# Patient Record
Sex: MALE | Race: WHITE | Employment: FULL TIME | URBAN - METROPOLITAN AREA
[De-identification: names, ages, dates, MRNs, and addresses within clinical notes are randomized per-mention and may not be internally consistent; named-entity substitution may affect disease eponyms.]

---

## 2024-09-15 ENCOUNTER — APPOINTMENT (OUTPATIENT)
Dept: GENERAL RADIOLOGY | Age: 29
End: 2024-09-15
Payer: COMMERCIAL

## 2024-09-15 ENCOUNTER — HOSPITAL ENCOUNTER (EMERGENCY)
Age: 29
Discharge: HOME | End: 2024-09-15
Attending: EMERGENCY MEDICINE
Payer: COMMERCIAL

## 2024-09-15 VITALS
OXYGEN SATURATION: 98 % | DIASTOLIC BLOOD PRESSURE: 78 MMHG | BODY MASS INDEX: 23.62 KG/M2 | SYSTOLIC BLOOD PRESSURE: 127 MMHG | RESPIRATION RATE: 16 BRPM | TEMPERATURE: 97.9 F | HEIGHT: 75 IN | WEIGHT: 190 LBS | HEART RATE: 74 BPM

## 2024-09-15 DIAGNOSIS — G40.919 BREAKTHROUGH SEIZURE (HCC): Primary | ICD-10-CM

## 2024-09-15 DIAGNOSIS — S43.004A DISLOCATION OF RIGHT SHOULDER JOINT, INITIAL ENCOUNTER: ICD-10-CM

## 2024-09-15 LAB
ANION GAP SERPL CALCULATED.3IONS-SCNC: 10 MMOL/L (ref 7–16)
BASOPHILS # BLD: 0.04 K/UL (ref 0–0.2)
BASOPHILS NFR BLD: 1 % (ref 0–2)
BUN SERPL-MCNC: 14 MG/DL (ref 6–20)
CALCIUM SERPL-MCNC: 8.8 MG/DL (ref 8.6–10.2)
CHLORIDE SERPL-SCNC: 103 MMOL/L (ref 98–107)
CO2 SERPL-SCNC: 25 MMOL/L (ref 22–29)
CREAT SERPL-MCNC: 0.8 MG/DL (ref 0.7–1.2)
DATE LAST DOSE: ABNORMAL
EOSINOPHIL # BLD: 0.06 K/UL (ref 0.05–0.5)
EOSINOPHILS RELATIVE PERCENT: 1 % (ref 0–6)
ERYTHROCYTE [DISTWIDTH] IN BLOOD BY AUTOMATED COUNT: 12.5 % (ref 11.5–15)
GFR, ESTIMATED: >90 ML/MIN/1.73M2
GLUCOSE SERPL-MCNC: 93 MG/DL (ref 74–99)
HCT VFR BLD AUTO: 42.7 % (ref 37–54)
HGB BLD-MCNC: 14.2 G/DL (ref 12.5–16.5)
IMM GRANULOCYTES # BLD AUTO: 0.06 K/UL (ref 0–0.58)
IMM GRANULOCYTES NFR BLD: 1 % (ref 0–5)
LYMPHOCYTES NFR BLD: 1.15 K/UL (ref 1.5–4)
LYMPHOCYTES RELATIVE PERCENT: 13 % (ref 20–42)
MCH RBC QN AUTO: 32.3 PG (ref 26–35)
MCHC RBC AUTO-ENTMCNC: 33.3 G/DL (ref 32–34.5)
MCV RBC AUTO: 97 FL (ref 80–99.9)
MONOCYTES NFR BLD: 0.74 K/UL (ref 0.1–0.95)
MONOCYTES NFR BLD: 9 % (ref 2–12)
NEUTROPHILS NFR BLD: 76 % (ref 43–80)
NEUTS SEG NFR BLD: 6.57 K/UL (ref 1.8–7.3)
PLATELET # BLD AUTO: 215 K/UL (ref 130–450)
PMV BLD AUTO: 9.7 FL (ref 7–12)
POTASSIUM SERPL-SCNC: 4.3 MMOL/L (ref 3.5–5)
RBC # BLD AUTO: 4.4 M/UL (ref 3.8–5.8)
SODIUM SERPL-SCNC: 138 MMOL/L (ref 132–146)
TME LAST DOSE: ABNORMAL H
VALPROATE SERPL-MCNC: 19 UG/ML (ref 50–100)
VANCOMYCIN DOSE: ABNORMAL MG
WBC OTHER # BLD: 8.6 K/UL (ref 4.5–11.5)

## 2024-09-15 PROCEDURE — 6360000002 HC RX W HCPCS

## 2024-09-15 PROCEDURE — 23650 CLTX SHO DSLC W/MNPJ WO ANES: CPT

## 2024-09-15 PROCEDURE — 96374 THER/PROPH/DIAG INJ IV PUSH: CPT

## 2024-09-15 PROCEDURE — 80164 ASSAY DIPROPYLACETIC ACD TOT: CPT

## 2024-09-15 PROCEDURE — 2500000003 HC RX 250 WO HCPCS

## 2024-09-15 PROCEDURE — 73030 X-RAY EXAM OF SHOULDER: CPT

## 2024-09-15 PROCEDURE — 99285 EMERGENCY DEPT VISIT HI MDM: CPT

## 2024-09-15 PROCEDURE — 96375 TX/PRO/DX INJ NEW DRUG ADDON: CPT

## 2024-09-15 PROCEDURE — 80048 BASIC METABOLIC PNL TOTAL CA: CPT

## 2024-09-15 PROCEDURE — 2580000003 HC RX 258

## 2024-09-15 PROCEDURE — 85025 COMPLETE CBC W/AUTO DIFF WBC: CPT

## 2024-09-15 RX ORDER — PROPOFOL 10 MG/ML
1 INJECTION, EMULSION INTRAVENOUS ONCE
Status: COMPLETED | OUTPATIENT
Start: 2024-09-15 | End: 2024-09-15

## 2024-09-15 RX ORDER — 0.9 % SODIUM CHLORIDE 0.9 %
1000 INTRAVENOUS SOLUTION INTRAVENOUS ONCE
Status: COMPLETED | OUTPATIENT
Start: 2024-09-15 | End: 2024-09-15

## 2024-09-15 RX ORDER — KETAMINE HYDROCHLORIDE 10 MG/ML
1 INJECTION, SOLUTION INTRAMUSCULAR; INTRAVENOUS ONCE
Status: COMPLETED | OUTPATIENT
Start: 2024-09-15 | End: 2024-09-15

## 2024-09-15 RX ADMIN — KETAMINE HYDROCHLORIDE 75 MG: 10 INJECTION INTRAMUSCULAR; INTRAVENOUS at 09:20

## 2024-09-15 RX ADMIN — SODIUM CHLORIDE 1000 ML: 9 INJECTION, SOLUTION INTRAVENOUS at 07:29

## 2024-09-15 RX ADMIN — PROPOFOL 75 MG: 10 INJECTION, EMULSION INTRAVENOUS at 09:20

## 2024-09-15 ASSESSMENT — PAIN DESCRIPTION - ORIENTATION
ORIENTATION: RIGHT
ORIENTATION: RIGHT

## 2024-09-15 ASSESSMENT — PAIN DESCRIPTION - ONSET: ONSET: SUDDEN

## 2024-09-15 ASSESSMENT — LIFESTYLE VARIABLES
HOW OFTEN DO YOU HAVE A DRINK CONTAINING ALCOHOL: NEVER
HOW MANY STANDARD DRINKS CONTAINING ALCOHOL DO YOU HAVE ON A TYPICAL DAY: PATIENT DOES NOT DRINK

## 2024-09-15 ASSESSMENT — PAIN DESCRIPTION - DESCRIPTORS
DESCRIPTORS: ACHING
DESCRIPTORS: ACHING

## 2024-09-15 ASSESSMENT — PAIN SCALES - GENERAL
PAINLEVEL_OUTOF10: 8

## 2024-09-15 ASSESSMENT — PAIN - FUNCTIONAL ASSESSMENT: PAIN_FUNCTIONAL_ASSESSMENT: 0-10

## 2024-09-15 ASSESSMENT — PAIN DESCRIPTION - LOCATION: LOCATION: SHOULDER

## 2024-09-15 ASSESSMENT — PAIN DESCRIPTION - FREQUENCY: FREQUENCY: CONTINUOUS

## 2024-12-26 ENCOUNTER — HOSPITAL ENCOUNTER (EMERGENCY)
Age: 29
Discharge: HOME | End: 2024-12-26
Attending: EMERGENCY MEDICINE
Payer: COMMERCIAL

## 2024-12-26 ENCOUNTER — APPOINTMENT (OUTPATIENT)
Dept: GENERAL RADIOLOGY | Age: 29
End: 2024-12-26
Payer: COMMERCIAL

## 2024-12-26 VITALS
TEMPERATURE: 98 F | SYSTOLIC BLOOD PRESSURE: 114 MMHG | BODY MASS INDEX: 26.11 KG/M2 | WEIGHT: 210 LBS | OXYGEN SATURATION: 97 % | DIASTOLIC BLOOD PRESSURE: 64 MMHG | RESPIRATION RATE: 18 BRPM | HEIGHT: 75 IN | HEART RATE: 92 BPM

## 2024-12-26 DIAGNOSIS — S43.004A DISLOCATION OF RIGHT SHOULDER JOINT, INITIAL ENCOUNTER: Primary | ICD-10-CM

## 2024-12-26 PROCEDURE — 96374 THER/PROPH/DIAG INJ IV PUSH: CPT

## 2024-12-26 PROCEDURE — 2500000003 HC RX 250 WO HCPCS: Performed by: EMERGENCY MEDICINE

## 2024-12-26 PROCEDURE — 23655 CLTX SHO DSLC W/MNPJ W/ANES: CPT

## 2024-12-26 PROCEDURE — 99284 EMERGENCY DEPT VISIT MOD MDM: CPT

## 2024-12-26 PROCEDURE — 73030 X-RAY EXAM OF SHOULDER: CPT

## 2024-12-26 PROCEDURE — 99152 MOD SED SAME PHYS/QHP 5/>YRS: CPT

## 2024-12-26 PROCEDURE — 99153 MOD SED SAME PHYS/QHP EA: CPT

## 2024-12-26 RX ORDER — KETAMINE HYDROCHLORIDE 10 MG/ML
1 INJECTION, SOLUTION INTRAMUSCULAR; INTRAVENOUS ONCE
Status: COMPLETED | OUTPATIENT
Start: 2024-12-26 | End: 2024-12-26

## 2024-12-26 RX ADMIN — KETAMINE HYDROCHLORIDE 55.3 MG: 10 INJECTION INTRAMUSCULAR; INTRAVENOUS at 01:23

## 2024-12-26 ASSESSMENT — PAIN DESCRIPTION - LOCATION
LOCATION: SHOULDER
LOCATION: SHOULDER

## 2024-12-26 ASSESSMENT — PAIN SCALES - GENERAL
PAINLEVEL_OUTOF10: 9
PAINLEVEL_OUTOF10: 10

## 2024-12-26 ASSESSMENT — PAIN DESCRIPTION - DESCRIPTORS
DESCRIPTORS: THROBBING;SHOOTING
DESCRIPTORS: STABBING;TENDER;ACHING

## 2024-12-26 ASSESSMENT — PAIN - FUNCTIONAL ASSESSMENT: PAIN_FUNCTIONAL_ASSESSMENT: 0-10

## 2024-12-26 ASSESSMENT — LIFESTYLE VARIABLES
HOW MANY STANDARD DRINKS CONTAINING ALCOHOL DO YOU HAVE ON A TYPICAL DAY: 3 OR 4
HOW OFTEN DO YOU HAVE A DRINK CONTAINING ALCOHOL: 2-3 TIMES A WEEK

## 2024-12-26 ASSESSMENT — PAIN DESCRIPTION - ORIENTATION
ORIENTATION: RIGHT
ORIENTATION: RIGHT

## 2024-12-26 NOTE — ED NOTES
55mg Ketamine given via IV 20 gauge left hand by . Patient connected to cardiac monitor, BP cuff, CO2 monitor, pulse ox monitor, on 2L NC, ambu bag at bedside.

## 2024-12-26 NOTE — ED PROVIDER NOTES
HPI:  12/26/24, Time: 12:30 AM LILY Berger is a 29 y.o. male history of hypothyroidism history of seizure history of shoulder dislocation presenting to the ED for right shoulder pain, beginning short time ago.  The complaint has been persistent, moderate in severity, and worsened by movement of right shoulder.  Patient reports he was play fighting with nephew as well as sister.  Patient reports they are wrestling on the ground and his shoulder dislocated.  Patient has history of this in the past he reports no head injury reports no neck or back pain or chest pain or abdominal pain.  Patient reporting prior history of dislocations in the past especially the left which he usually is able to reduce on his own.  Patient denies any seizure activity today    ROS:   Pertinent positives and negatives are stated within HPI, all other systems reviewed and are negative.  --------------------------------------------- PAST HISTORY ---------------------------------------------  Past Medical History:  has a past medical history of Hypothyroidism and Seizures (HCC).    Past Surgical History:  has no past surgical history on file.    Social History:  reports that he has been smoking cigarettes. He has never used smokeless tobacco. He reports that he does not drink alcohol and does not use drugs.    Family History: family history includes Asthma in his brother and sister; Hypertension in his mother; Ovarian Cancer in his maternal grandmother; Stroke in his father; Thyroid Disease in his maternal aunt, maternal grandmother, and another family member.     The patient’s home medications have been reviewed.    Allergies: Patient has no known allergies.    ---------------------------------------------------PHYSICAL EXAM--------------------------------------    Constitutional/General: Alert and oriented x3, well appearing, non toxic in NAD  Head: Normocephalic and atraumatic  Eyes: PERRL, EOMI  Mouth: Oropharynx clear,

## 2024-12-27 ENCOUNTER — TELEPHONE (OUTPATIENT)
Dept: ORTHOPEDIC SURGERY | Age: 29
End: 2024-12-27

## 2024-12-27 NOTE — TELEPHONE ENCOUNTER
----- Message from Dr. Jayy Gomez MD sent at 12/26/2024  5:47 PM EST -----  Raudel next week  ----- Message -----  From: Jose Manuel Freed MD  Sent: 12/26/2024   3:34 PM EST  To: Jayy Gomez MD

## 2025-04-14 ENCOUNTER — APPOINTMENT (OUTPATIENT)
Dept: GENERAL RADIOLOGY | Age: 30
End: 2025-04-14
Payer: COMMERCIAL

## 2025-04-14 ENCOUNTER — HOSPITAL ENCOUNTER (EMERGENCY)
Age: 30
Discharge: HOME | End: 2025-04-14
Attending: EMERGENCY MEDICINE
Payer: COMMERCIAL

## 2025-04-14 VITALS
OXYGEN SATURATION: 99 % | HEART RATE: 88 BPM | RESPIRATION RATE: 19 BRPM | TEMPERATURE: 97.9 F | SYSTOLIC BLOOD PRESSURE: 129 MMHG | DIASTOLIC BLOOD PRESSURE: 89 MMHG

## 2025-04-14 DIAGNOSIS — S43.005A DISLOCATION OF LEFT SHOULDER JOINT, INITIAL ENCOUNTER: Primary | ICD-10-CM

## 2025-04-14 PROCEDURE — 6370000000 HC RX 637 (ALT 250 FOR IP): Performed by: PHYSICIAN ASSISTANT

## 2025-04-14 PROCEDURE — 73030 X-RAY EXAM OF SHOULDER: CPT

## 2025-04-14 PROCEDURE — 99283 EMERGENCY DEPT VISIT LOW MDM: CPT

## 2025-04-14 PROCEDURE — 6360000002 HC RX W HCPCS

## 2025-04-14 PROCEDURE — 23655 CLTX SHO DSLC W/MNPJ W/ANES: CPT

## 2025-04-14 RX ORDER — OXYCODONE AND ACETAMINOPHEN 5; 325 MG/1; MG/1
1 TABLET ORAL ONCE
Refills: 0 | Status: COMPLETED | OUTPATIENT
Start: 2025-04-14 | End: 2025-04-14

## 2025-04-14 RX ORDER — LIDOCAINE HYDROCHLORIDE 10 MG/ML
20 INJECTION, SOLUTION INFILTRATION; PERINEURAL ONCE
Status: COMPLETED | OUTPATIENT
Start: 2025-04-14 | End: 2025-04-14

## 2025-04-14 RX ADMIN — OXYCODONE AND ACETAMINOPHEN 1 TABLET: 5; 325 TABLET ORAL at 13:35

## 2025-04-14 RX ADMIN — LIDOCAINE HYDROCHLORIDE 20 ML: 10 INJECTION, SOLUTION INFILTRATION; PERINEURAL at 15:15

## 2025-04-14 ASSESSMENT — LIFESTYLE VARIABLES
HOW OFTEN DO YOU HAVE A DRINK CONTAINING ALCOHOL: MONTHLY OR LESS
HOW MANY STANDARD DRINKS CONTAINING ALCOHOL DO YOU HAVE ON A TYPICAL DAY: 1 OR 2

## 2025-04-14 ASSESSMENT — PAIN SCALES - GENERAL: PAINLEVEL_OUTOF10: 6

## 2025-04-14 NOTE — ED PROVIDER NOTES
[facility] EMERGENCY DEPARTMENT  EMERGENCY DEPARTMENT ENCOUNTER        Pt Name: Alfonso Berger  MRN: [id number]  Birthdate 1995  Date of evaluation: 4/14/2025  Provider: Emeterio Dodd DO  PCP: Bandar Choi DO  Note Started: 2:58 PM EDT 4/14/25    CHIEF COMPLAINT       Chief Complaint   Patient presents with    Arm Injury     Left arm dislocated at work today        HISTORY OF PRESENT ILLNESS: 1 or more Elements   History From: patient    Limitations to history : None    Alfonso Berger is a 30 y.o. male who presents the emergency department for left shoulder pain that started 2 hours prior to arrival.  Patient reports he went to reach for something on the shelf and believes he dislocated his left shoulder.  He dislocated his left shoulder multiple times. Patient denies fever, chills, headache, shortness of breath, chest pain, abdominal pain, nausea, vomiting, diarrhea.    Nursing Notes were all reviewed and agreed with or any disagreements were addressed in the HPI.          REVIEW OF SYSTEMS :           Positives and Pertinent negatives as per HPI.     SURGICAL HISTORY   No past surgical history on file.    CURRENTMEDICATIONS       Discharge Medication List as of 4/14/2025  6:13 PM        CONTINUE these medications which have NOT CHANGED    Details   divalproex (DEPAKOTE ER) 250 MG ER tablet Take 500 mg by mouth 2 times daily. Take one tablet by the mouth every morning and 2 tablets in the evening      levothyroxine (SYNTHROID) 88 MCG tablet Take 1 tablet by mouth daily., Disp-90 tablet, R-1             ALLERGIES     Patient has no known allergies.    FAMILYHISTORY       Family History   Problem Relation Age of Onset    Hypertension Mother     Stroke Father     Asthma Sister     Asthma Brother     Thyroid Disease Maternal Aunt     Thyroid Disease Maternal Grandmother     Ovarian Cancer Maternal Grandmother     Thyroid Disease Other         SOCIAL HISTORY       Social History

## 2025-04-17 ENCOUNTER — OFFICE VISIT (OUTPATIENT)
Age: 30
End: 2025-04-17

## 2025-04-17 VITALS — HEIGHT: 75 IN | BODY MASS INDEX: 26.11 KG/M2 | WEIGHT: 210 LBS | TEMPERATURE: 97.9 F

## 2025-04-17 DIAGNOSIS — S43.015A ANTERIOR SHOULDER DISLOCATION, LEFT, INITIAL ENCOUNTER: Primary | ICD-10-CM

## 2025-04-17 NOTE — PROGRESS NOTES
Chief Complaint   Patient presents with    Shoulder Pain     Pt presents to office today for left shoulder pain. Injury at work happened on 4-14-25. Pt is a  and an object twisted his arm one way and his body the other.        Alfonso Berger  presents for initial evaluation of his patient dislocated his left shoulder at work they were not able to reduce it.  Went to the emergency room where he had a closed reduction.  He has been on restricted duty since then..       Past Medical History:   Diagnosis Date    Hypothyroidism     Seizures (HCC)      No past surgical history on file.    Current Outpatient Medications:     divalproex (DEPAKOTE ER) 250 MG ER tablet, Take 2 tablets by mouth 2 times daily Take one tablet by the mouth every morning and 2 tablets in the evening, Disp: , Rfl:   No Known Allergies  Social History     Socioeconomic History    Marital status: Single     Spouse name: Not on file    Number of children: Not on file    Years of education: Not on file    Highest education level: Not on file   Occupational History    Not on file   Tobacco Use    Smoking status: Every Day     Current packs/day: 0.50     Types: Cigarettes    Smokeless tobacco: Never   Substance and Sexual Activity    Alcohol use: No    Drug use: No    Sexual activity: Not on file   Other Topics Concern    Not on file   Social History Narrative    Not on file     Social Drivers of Health     Financial Resource Strain: Not on file   Food Insecurity: Not on file   Transportation Needs: Not on file   Physical Activity: Not on file   Stress: Not on file   Social Connections: Not on file   Intimate Partner Violence: Not on file   Housing Stability: Not on file     Family History   Problem Relation Age of Onset    Hypertension Mother     Stroke Father     Asthma Sister     Asthma Brother     Thyroid Disease Maternal Aunt     Thyroid Disease Maternal Grandmother     Ovarian Cancer Maternal Grandmother     Thyroid Disease Other

## 2025-05-18 ENCOUNTER — APPOINTMENT (OUTPATIENT)
Dept: GENERAL RADIOLOGY | Age: 30
End: 2025-05-18
Payer: COMMERCIAL

## 2025-05-18 ENCOUNTER — HOSPITAL ENCOUNTER (EMERGENCY)
Age: 30
Discharge: HOME | End: 2025-05-18
Attending: EMERGENCY MEDICINE
Payer: COMMERCIAL

## 2025-05-18 VITALS
RESPIRATION RATE: 17 BRPM | OXYGEN SATURATION: 100 % | SYSTOLIC BLOOD PRESSURE: 129 MMHG | DIASTOLIC BLOOD PRESSURE: 63 MMHG | HEART RATE: 58 BPM | WEIGHT: 210 LBS | TEMPERATURE: 98.2 F | BODY MASS INDEX: 26.25 KG/M2

## 2025-05-18 DIAGNOSIS — S43.015A ANTERIOR DISLOCATION OF LEFT SHOULDER, INITIAL ENCOUNTER: Primary | ICD-10-CM

## 2025-05-18 PROCEDURE — 73030 X-RAY EXAM OF SHOULDER: CPT

## 2025-05-18 PROCEDURE — 99285 EMERGENCY DEPT VISIT HI MDM: CPT

## 2025-05-18 PROCEDURE — 6360000002 HC RX W HCPCS: Performed by: EMERGENCY MEDICINE

## 2025-05-18 PROCEDURE — 2500000003 HC RX 250 WO HCPCS: Performed by: EMERGENCY MEDICINE

## 2025-05-18 PROCEDURE — 23650 CLTX SHO DSLC W/MNPJ WO ANES: CPT

## 2025-05-18 RX ADMIN — Medication 50 MG: at 13:56

## 2025-05-18 RX ADMIN — PROPOFOL 50 MG: 10 INJECTION, EMULSION INTRAVENOUS at 13:56

## 2025-05-18 ASSESSMENT — PAIN - FUNCTIONAL ASSESSMENT: PAIN_FUNCTIONAL_ASSESSMENT: 0-10

## 2025-05-18 ASSESSMENT — PAIN SCALES - GENERAL: PAINLEVEL_OUTOF10: 6

## 2025-05-18 ASSESSMENT — PAIN DESCRIPTION - DESCRIPTORS: DESCRIPTORS: BURNING;SHARP

## 2025-05-18 ASSESSMENT — PAIN DESCRIPTION - ORIENTATION: ORIENTATION: LEFT

## 2025-05-18 ASSESSMENT — PAIN DESCRIPTION - LOCATION: LOCATION: SHOULDER

## 2025-05-18 NOTE — DISCHARGE INSTRUCTIONS
Procedural Sedation, Adult  Medicine(s) were given today to help you feel comfortable and relaxed during tests and/or treatments. Like any medicines, these can cause unwanted but temporary effects.   HOME CARE INSTRUCTIONS  Warning: The medicine(s) given today may cause temporary drowsiness, clumsiness or poor balance. Poor judgment can also result before the medicine(s) wear off. Someone should meet you and drive you home. Do not drive, operate machinery, use power or hand tools for the next 12 hours.   Do not make important decisions until improved. Avoid exercising, bicycling, swimming, climbing ladders, or working at heights for the remainder of the day.   You may temporarily feel sick, weak, or dizzy. This is expected. Vomiting may occur if you eat too soon. When you can drink without vomiting, try water, juice, or soup. Try solid foods if you feel little or no nausea and continue to feel better.   If pain killers have been prescribed for you, ask your caregiver how soon it is safe to take them.   You should not drink alcohol, take sleeping pills, or medicines that cause drowsiness for at least 24 hours.   If you smoke, do not smoke alone.   Only take over-the-counter or prescription medicines for pain, discomfort, or fever as directed by your caregiver.   If you are feeling better, you may resume normal activities 24 hours after receiving sedation.   SEEK MEDICAL CARE IF:  Breathing problems develop.   Your skin is pale or bluish in color.   Pain is getting worse and not helped by medication.   There is bleeding or swelling.   A temperature over 101º F (38.3º C) develops.   You are still sleepy or feeling clumsy after 24 hours.

## 2025-05-18 NOTE — ED NOTES
Consent for conscious sedation and closed reduction of left shoulder signed by patient & placed on paper chart.

## 2025-05-18 NOTE — ED PROVIDER NOTES
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  Patient has had multiple bilateral shoulder dislocations in the past.  Differentials include fracture versus dislocation.  Vitals are unremarkable.  He is afebrile.  On exam he has no significant tenderness or open wounds noted to the left arm.  2+ left radial pulse intact.  X-ray of the left shoulder obtained showing anterior, inferior coracoid dislocation of the left glenohumeral joint without evidence of definite fracture.  Consented for conscious sedation and joint reduction were obtained.  Patient received Ketofol with successful dislocation reduction of the left shoulder.  Repeat x-rays were obtained and on my interpretation show successful reduction.  Sling and swath were applied.  Patient was monitored for improvement after conscious sedation.  Vitals have remained stable.  He was provided with post-sedation information.  He was given orthopedic surgery in our system to follow with.  Patient stable for discharge at this time      CONSULTS: (Who and What was discussed)  None      I am the Primary Clinician of Record.    FINAL IMPRESSION      1. Anterior dislocation of left shoulder, initial encounter          DISPOSITION/PLAN     DISPOSITION Decision To Discharge 05/18/2025 03:25:05 PM      PATIENT REFERRED TO:  Mtat Ellison MD  [address] [place] [address]  [phone]    Schedule an appointment as soon as possible for a visit         DISCHARGE MEDICATIONS:  Discharge Medication List as of 5/18/2025  3:39 PM          DISCONTINUED MEDICATIONS:  Discharge Medication List as of 5/18/2025  3:39 PM                 (Please note that portions of this note were completed with a voice recognition program.  Efforts were made to edit the dictations but occasionally words are

## 2025-05-18 NOTE — SEDATION DOCUMENTATION
Patient awake & alert x 4, on phone speaking to family.  IV fluids continue. Will continue to monitor vitals.

## 2025-05-23 ENCOUNTER — HOSPITAL ENCOUNTER (EMERGENCY)
Age: 30
Discharge: HOME | End: 2025-05-23
Attending: EMERGENCY MEDICINE
Payer: COMMERCIAL

## 2025-05-23 ENCOUNTER — APPOINTMENT (OUTPATIENT)
Dept: GENERAL RADIOLOGY | Age: 30
End: 2025-05-23
Payer: COMMERCIAL

## 2025-05-23 VITALS
DIASTOLIC BLOOD PRESSURE: 86 MMHG | HEART RATE: 67 BPM | RESPIRATION RATE: 19 BRPM | TEMPERATURE: 97.7 F | OXYGEN SATURATION: 100 % | HEIGHT: 75 IN | WEIGHT: 210 LBS | SYSTOLIC BLOOD PRESSURE: 127 MMHG | BODY MASS INDEX: 26.11 KG/M2

## 2025-05-23 DIAGNOSIS — S43.015A ANTERIOR SHOULDER DISLOCATION, LEFT, INITIAL ENCOUNTER: Primary | ICD-10-CM

## 2025-05-23 PROCEDURE — 73030 X-RAY EXAM OF SHOULDER: CPT

## 2025-05-23 PROCEDURE — 23650 CLTX SHO DSLC W/MNPJ WO ANES: CPT

## 2025-05-23 PROCEDURE — 6360000002 HC RX W HCPCS

## 2025-05-23 PROCEDURE — 99285 EMERGENCY DEPT VISIT HI MDM: CPT

## 2025-05-23 RX ADMIN — PROPOFOL 70 MG: 10 INJECTION, EMULSION INTRAVENOUS at 09:18

## 2025-05-23 ASSESSMENT — PAIN - FUNCTIONAL ASSESSMENT: PAIN_FUNCTIONAL_ASSESSMENT: 0-10

## 2025-05-23 ASSESSMENT — PAIN DESCRIPTION - ORIENTATION: ORIENTATION: LEFT

## 2025-05-23 ASSESSMENT — PAIN SCALES - GENERAL: PAINLEVEL_OUTOF10: 5

## 2025-05-23 ASSESSMENT — LIFESTYLE VARIABLES
HOW OFTEN DO YOU HAVE A DRINK CONTAINING ALCOHOL: 2-4 TIMES A MONTH
HOW MANY STANDARD DRINKS CONTAINING ALCOHOL DO YOU HAVE ON A TYPICAL DAY: 1 OR 2

## 2025-05-23 ASSESSMENT — PAIN DESCRIPTION - LOCATION: LOCATION: SHOULDER

## 2025-05-23 ASSESSMENT — PAIN DESCRIPTION - DESCRIPTORS: DESCRIPTORS: DISCOMFORT;ACHING

## 2025-05-23 NOTE — ED PROVIDER NOTES
ATTENDING PROVIDER ATTESTATION:     Alfonso Berger presented to the emergency department for evaluation of Shoulder Pain (\"Dislocated left shoulder, has happened many times. Happened while sleeping.\" Has ortho consult on Thursday. )   and was initially evaluated by the Medical Resident. See Original ED Note for H&P and ED course above.     I have reviewed and discussed the case, including pertinent history (medical, surgical, family and social) and exam findings with the Medical Resident assigned to Alfonso CADENA Berger.  I have personally performed and/or participated in the history, exam, medical decision making, and procedures and agree with all pertinent clinical information and any additional changes or corrections are noted below in my assessment and plan. I have discussed this patient in detail with the resident, and provided the instruction and education,       I have reviewed my findings and recommendations with the assigned Medical Resident, Alfonso Berger and members of family present at the time of disposition.      I have performed a history and physical examination of this patient and directly supervised the resident caring for this patient              [facility] EMERGENCY DEPARTMENT  EMERGENCY DEPARTMENT ENCOUNTER        Pt Name: Alfonso Berger  MRN: [id number]  Birthdate 1995  Date of evaluation: 5/23/2025  Provider: Alexandru Giraldo MD  PCP: Bandar Choi DO  Note Started: 6:00 AM EDT 5/23/25    CHIEF COMPLAINT:     Chief Complaint   Patient presents with    Shoulder Pain     \"Dislocated left shoulder, has happened many times. Happened while sleeping.\" Has ortho consult on Thursday.        HISTORY OF PRESENT ILLNESS:       Alfonso Berger is a 30 y.o. male who presents for left shoulder pain and concerns for dislocation. Reports he was sleeping and woke up to left shoulder pain and deformity. Says he believes it popped out of socket. Reports he has dislocated the shoulder over 10

## 2025-05-23 NOTE — ED PROVIDER NOTES
[facility] EMERGENCY DEPARTMENT  EMERGENCY DEPARTMENT ENCOUNTER        Pt Name: Alfonso Berger  MRN: [id number]  Birthdate 1995  Date of evaluation: 5/23/2025  Provider: Magui Perez MD  PCP: Bandar Choi DO  Note Started: 6:45 AM EDT 5/23/25    CHIEF COMPLAINT       Chief Complaint   Patient presents with    Shoulder Pain     \"Dislocated left shoulder, has happened many times. Happened while sleeping.\" Has ortho consult on Thursday.        HISTORY OF PRESENT ILLNESS: 1 or more Elements   History From: Patient    Limitations to history : None    Alfonso Berger is a 30 y.o. male who presents for left shoulder dislocation.  The patient has been seen multiple times lately for bilateral shoulder dislocations.  Patient states he was sleeping when it happened.  He denies any trauma or injuries.  Denies other complaints including chest pain, shortness of breath.    Nursing Notes were all reviewed and agreed with or any disagreements were addressed in the HPI.        REVIEW OF EXTERNAL NOTE :       Patient was seen on 5/18/2025 for anterior dislocation of left shoulder    REVIEW OF SYSTEMS :           Positives and Pertinent negatives as per HPI.     SURGICAL HISTORY   History reviewed. No pertinent surgical history.    CURRENTMEDICATIONS       Discharge Medication List as of 5/23/2025 11:32 AM        CONTINUE these medications which have NOT CHANGED    Details   divalproex (DEPAKOTE ER) 250 MG ER tablet Take 2 tablets by mouth 2 times daily Take one tablet by the mouth every morning and 2 tablets in the eveningHistorical Med             ALLERGIES     Patient has no known allergies.    FAMILYHISTORY       Family History   Problem Relation Age of Onset    Hypertension Mother     Stroke Father     Asthma Sister     Asthma Brother     Thyroid Disease Maternal Aunt     Thyroid Disease Maternal Grandmother     Ovarian Cancer Maternal Grandmother     Thyroid Disease Other        portions of this note were completed with a voice recognition program.  Efforts were made to edit the dictations but occasionally words are mis-transcribed.)    Magui Perez MD (electronically signed)

## 2025-05-23 NOTE — ED NOTES
Requesting to get up to BR.  Disconnected monitoring equipment and assisted up to BR. Gait steady without assistance.  Sling intact to L arm.

## 2025-05-29 ENCOUNTER — OFFICE VISIT (OUTPATIENT)
Age: 30
End: 2025-05-29

## 2025-05-29 VITALS — TEMPERATURE: 97.9 F | HEIGHT: 75 IN | WEIGHT: 210 LBS | BODY MASS INDEX: 26.11 KG/M2

## 2025-05-29 DIAGNOSIS — S43.085A OTHER DISLOCATION OF LEFT SHOULDER JOINT, INITIAL ENCOUNTER: Primary | ICD-10-CM

## 2025-05-29 NOTE — PROGRESS NOTES
Chief Complaint   Patient presents with    Shoulder Pain     Pt presents to office today for Left shoulder pain f/u. Dislocation happened at work on 5.2.25. ROM WNL, Pt states he dislocated his shoulder 2 more times since his last visit and went to ER to have Shoulder popped back in place.         Alfonso Berger returns today for follow-up of left shoulder.  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  Both required going to the emergency room.  He has not had an MRI of his left shoulder yet..      Past Medical History:   Diagnosis Date    Hypothyroidism     Seizures (HCC)      No past surgical history on file.    Current Outpatient Medications:     divalproex (DEPAKOTE ER) 250 MG ER tablet, Take 2 tablets by mouth 2 times daily Take one tablet by the mouth every morning and 2 tablets in the evening, Disp: , Rfl:   No Known Allergies  Social History     Socioeconomic History    Marital status: Single     Spouse name: Not on file    Number of children: Not on file    Years of education: Not on file    Highest education level: Not on file   Occupational History    Not on file   Tobacco Use    Smoking status: Every Day     Current packs/day: 0.50     Types: Cigarettes    Smokeless tobacco: Never   Vaping Use    Vaping status: Never Used   Substance and Sexual Activity    Alcohol use: Yes     Comment: socially.    Drug use: No    Sexual activity: Not on file   Other Topics Concern    Not on file   Social History Narrative    Not on file     Social Drivers of Health     Financial Resource Strain: Not on file   Food Insecurity: Not on file   Transportation Needs: Not on file   Physical Activity: Not on file   Stress: Not on file   Social Connections: Not on file   Intimate Partner Violence: Not on file   Housing Stability: Not on file     Family History   Problem Relation Age of Onset    Hypertension Mother     Stroke Father     Asthma Sister     Asthma Brother

## 2025-06-04 DIAGNOSIS — S43.015A ANTERIOR SHOULDER DISLOCATION, LEFT, INITIAL ENCOUNTER: Primary | ICD-10-CM

## 2025-06-16 ENCOUNTER — TELEPHONE (OUTPATIENT)
Age: 30
End: 2025-06-16

## 2025-06-16 NOTE — TELEPHONE ENCOUNTER
Patient called wanting to schedule an appointment for shortness of breath/ can't catch breath. It has happened off & on over the past few weeks when patient is at rest, not when he is exerting himself. When do you want to see him? He has not been here in several years.

## 2025-06-28 ENCOUNTER — APPOINTMENT (OUTPATIENT)
Dept: GENERAL RADIOLOGY | Age: 30
End: 2025-06-28
Payer: COMMERCIAL

## 2025-06-28 ENCOUNTER — HOSPITAL ENCOUNTER (EMERGENCY)
Age: 30
Discharge: HOME | End: 2025-06-28
Attending: EMERGENCY MEDICINE
Payer: COMMERCIAL

## 2025-06-28 VITALS
HEIGHT: 75 IN | BODY MASS INDEX: 26.73 KG/M2 | DIASTOLIC BLOOD PRESSURE: 84 MMHG | WEIGHT: 215 LBS | SYSTOLIC BLOOD PRESSURE: 124 MMHG | HEART RATE: 83 BPM | TEMPERATURE: 98.4 F | RESPIRATION RATE: 18 BRPM | OXYGEN SATURATION: 100 %

## 2025-06-28 DIAGNOSIS — S43.014A ANTERIOR DISLOCATION OF RIGHT SHOULDER, INITIAL ENCOUNTER: Primary | ICD-10-CM

## 2025-06-28 PROCEDURE — 23650 CLTX SHO DSLC W/MNPJ WO ANES: CPT

## 2025-06-28 PROCEDURE — 99152 MOD SED SAME PHYS/QHP 5/>YRS: CPT

## 2025-06-28 PROCEDURE — 99285 EMERGENCY DEPT VISIT HI MDM: CPT

## 2025-06-28 PROCEDURE — 6360000002 HC RX W HCPCS

## 2025-06-28 PROCEDURE — 73030 X-RAY EXAM OF SHOULDER: CPT

## 2025-06-28 PROCEDURE — 6360000002 HC RX W HCPCS: Performed by: EMERGENCY MEDICINE

## 2025-06-28 PROCEDURE — 96375 TX/PRO/DX INJ NEW DRUG ADDON: CPT

## 2025-06-28 PROCEDURE — 96374 THER/PROPH/DIAG INJ IV PUSH: CPT

## 2025-06-28 RX ORDER — ONDANSETRON 2 MG/ML
4 INJECTION INTRAMUSCULAR; INTRAVENOUS ONCE
Status: COMPLETED | OUTPATIENT
Start: 2025-06-28 | End: 2025-06-28

## 2025-06-28 RX ORDER — OXYCODONE AND ACETAMINOPHEN 5; 325 MG/1; MG/1
1 TABLET ORAL EVERY 6 HOURS PRN
Qty: 12 TABLET | Refills: 0 | Status: SHIPPED | OUTPATIENT
Start: 2025-06-28 | End: 2025-07-01

## 2025-06-28 RX ORDER — PROPOFOL 10 MG/ML
INJECTION, EMULSION INTRAVENOUS
Status: COMPLETED
Start: 2025-06-28 | End: 2025-06-28

## 2025-06-28 RX ORDER — FENTANYL CITRATE 50 UG/ML
50 INJECTION, SOLUTION INTRAMUSCULAR; INTRAVENOUS ONCE
Refills: 0 | Status: COMPLETED | OUTPATIENT
Start: 2025-06-28 | End: 2025-06-28

## 2025-06-28 RX ADMIN — PROPOFOL 150 MG: 10 INJECTION, EMULSION INTRAVENOUS at 20:51

## 2025-06-28 RX ADMIN — FENTANYL CITRATE 50 MCG: 50 INJECTION INTRAMUSCULAR; INTRAVENOUS at 19:29

## 2025-06-28 RX ADMIN — ONDANSETRON 4 MG: 2 INJECTION, SOLUTION INTRAMUSCULAR; INTRAVENOUS at 19:30

## 2025-06-28 ASSESSMENT — PAIN DESCRIPTION - LOCATION
LOCATION: SHOULDER
LOCATION: ARM

## 2025-06-28 ASSESSMENT — PAIN - FUNCTIONAL ASSESSMENT: PAIN_FUNCTIONAL_ASSESSMENT: 0-10

## 2025-06-28 ASSESSMENT — PAIN SCALES - GENERAL
PAINLEVEL_OUTOF10: 3
PAINLEVEL_OUTOF10: 1

## 2025-06-28 ASSESSMENT — PAIN DESCRIPTION - DESCRIPTORS: DESCRIPTORS: ACHING

## 2025-06-28 ASSESSMENT — PAIN DESCRIPTION - ORIENTATION: ORIENTATION: RIGHT

## 2025-06-28 NOTE — ED PROVIDER NOTES
HPI:  6/28/25,   Time: 7:07 PM EDT       Alfonso Berger is a 30 y.o. male presenting to the ED for rt shoulder injury, beginning 45 min ago.  The complaint has been persistent, moderate in severity, and worsened by movement of shoulder.  History dislocation.  Playing with nephew.  Feels like popped out.  No numbness/pain tingling.  Hurts to move    Review of Systems:   Pertinent positives and negatives are stated within HPI, all other systems reviewed and are negative.          --------------------------------------------- PAST HISTORY ---------------------------------------------  Past Medical History:  has a past medical history of Hypothyroidism and Seizures (HCC).    Past Surgical History:  has no past surgical history on file.    Social History:  reports that he has been smoking cigarettes. He has never used smokeless tobacco. He reports current alcohol use. He reports that he does not use drugs.    Family History: family history includes Asthma in his brother and sister; Hypertension in his mother; Ovarian Cancer in his maternal grandmother; Stroke in his father; Thyroid Disease in his maternal aunt, maternal grandmother, and another family member.     The patient’s home medications have been reviewed.    Allergies: Patient has no known allergies.        ---------------------------------------------------PHYSICAL EXAM--------------------------------------    Constitutional/General: Alert and oriented x3, well appearing, non toxic in NAD  Head: Normocephalic and atraumatic  Eyes: PERRL, EOMI, conjunctive normal, sclera non icteric  Mouth: Oropharynx clear, handling secretions,   Neck: Supple, full ROM,   Respiratory:  Not in respiratory distress  Cardiovascular:  Regular rate. Regular rhythm. . 2+ distal pulses    Musculoskeletal: Limited motion right shoulder.  Painful to palpation.  Neuro vas intact distal. Warm and well perfused, no clubbing, cyanosis, or edema. Capillary refill <3 seconds    Neurologic: GCS

## 2025-07-08 ENCOUNTER — OFFICE VISIT (OUTPATIENT)
Age: 30
End: 2025-07-08
Payer: COMMERCIAL

## 2025-07-08 VITALS
BODY MASS INDEX: 24.74 KG/M2 | DIASTOLIC BLOOD PRESSURE: 69 MMHG | WEIGHT: 199 LBS | OXYGEN SATURATION: 98 % | RESPIRATION RATE: 16 BRPM | HEIGHT: 75 IN | TEMPERATURE: 98.3 F | HEART RATE: 71 BPM | SYSTOLIC BLOOD PRESSURE: 102 MMHG

## 2025-07-08 DIAGNOSIS — G40.909 SEIZURE DISORDER (HCC): ICD-10-CM

## 2025-07-08 DIAGNOSIS — R06.02 SHORTNESS OF BREATH: Primary | ICD-10-CM

## 2025-07-08 PROCEDURE — 99214 OFFICE O/P EST MOD 30 MIN: CPT | Performed by: FAMILY MEDICINE

## 2025-07-08 RX ORDER — ALBUTEROL SULFATE 90 UG/1
2 INHALANT RESPIRATORY (INHALATION) 4 TIMES DAILY PRN
Qty: 18 G | Refills: 0 | Status: SHIPPED | OUTPATIENT
Start: 2025-07-08

## 2025-07-08 RX ORDER — ALBUTEROL SULFATE 90 UG/1
2 INHALANT RESPIRATORY (INHALATION) 4 TIMES DAILY PRN
Qty: 18 G | Refills: 5 | Status: SHIPPED | OUTPATIENT
Start: 2025-07-08 | End: 2025-07-08

## 2025-07-08 SDOH — ECONOMIC STABILITY: FOOD INSECURITY: WITHIN THE PAST 12 MONTHS, THE FOOD YOU BOUGHT JUST DIDN'T LAST AND YOU DIDN'T HAVE MONEY TO GET MORE.: NEVER TRUE

## 2025-07-08 SDOH — ECONOMIC STABILITY: FOOD INSECURITY: WITHIN THE PAST 12 MONTHS, YOU WORRIED THAT YOUR FOOD WOULD RUN OUT BEFORE YOU GOT MONEY TO BUY MORE.: NEVER TRUE

## 2025-07-08 ASSESSMENT — PATIENT HEALTH QUESTIONNAIRE - PHQ9
SUM OF ALL RESPONSES TO PHQ QUESTIONS 1-9: 0
SUM OF ALL RESPONSES TO PHQ QUESTIONS 1-9: 0
2. FEELING DOWN, DEPRESSED OR HOPELESS: NOT AT ALL
1. LITTLE INTEREST OR PLEASURE IN DOING THINGS: NOT AT ALL
SUM OF ALL RESPONSES TO PHQ QUESTIONS 1-9: 0
SUM OF ALL RESPONSES TO PHQ QUESTIONS 1-9: 0

## 2025-07-08 NOTE — PROGRESS NOTES
Alfonso Berger (:  1995) is a 30 y.o. male,Established patient, here for evaluation of the following chief complaint(s):  Shortness of Breath (Pt was having intermittent episodes of SOB.  Episodes were once a day or every other day but since 2 weeks.  No recent labs done.  )         Assessment & Plan  Shortness of breath   New, at goal (stable), changes made today: We are setting this patient up for chest x-ray albuterol inhaler as needed and pulmonary function testings I will see him after the pulmonary functions are done and chest x-ray will be called to him    Orders:    XR CHEST STANDARD (2 VW); Future    albuterol sulfate HFA (VENTOLIN HFA) 108 (90 Base) MCG/ACT inhaler; Inhale 2 puffs into the lungs 4 times daily as needed for Wheezing    Full PFT Study With Bronchodilator; Future    Seizure disorder (HCC)   This is a stable condition he follows with the pulmonology           No follow-ups on file.     In summary I will call him after I get the results of his pulmonary functions to discuss the next avenue of treatment if need be with pulmonary evaluation a chest x-ray will be done I put him on albuterol will see if it does indeed help him I will call him with the pulmonary functions and if need be we will have him see a lung doctor I do not think that will need to be the case  Subjective   HPI:  Patient comes in today called on around  he started having some intermittent shortness of breath usually during the day lasted sometimes only about 4 to 5 minutes no real wheezing no fever no chills itches bothered him getting air into his lungs he does not smoke.  Has a history of seizures nothing is changed in that regard he is undergoing treatment for shoulder issues both with a Worker's Comp. issue of 1 shoulder and a non-Worker's Comp. on the other shoulder with chronic dislocations in any event has had no fever no chills he does smoke about three quarters a pack of cigarettes per day.  He said no

## 2025-07-09 ENCOUNTER — HOSPITAL ENCOUNTER (OUTPATIENT)
Dept: GENERAL RADIOLOGY | Age: 30
Discharge: HOME | End: 2025-07-11
Payer: COMMERCIAL

## 2025-07-09 ENCOUNTER — HOSPITAL ENCOUNTER (OUTPATIENT)
Dept: MRI IMAGING | Age: 30
Discharge: HOME | End: 2025-07-11
Payer: COMMERCIAL

## 2025-07-09 DIAGNOSIS — S43.085A OTHER DISLOCATION OF LEFT SHOULDER JOINT, INITIAL ENCOUNTER: ICD-10-CM

## 2025-07-09 DIAGNOSIS — S43.015A ANTERIOR SHOULDER DISLOCATION, LEFT, INITIAL ENCOUNTER: ICD-10-CM

## 2025-07-09 PROCEDURE — 6360000004 HC RX CONTRAST MEDICATION: Performed by: PHYSICIAN ASSISTANT

## 2025-07-09 PROCEDURE — 73222 MRI JOINT UPR EXTREM W/DYE: CPT

## 2025-07-09 PROCEDURE — 73040 CONTRAST X-RAY OF SHOULDER: CPT

## 2025-07-09 PROCEDURE — A9579 GAD-BASE MR CONTRAST NOS,1ML: HCPCS | Performed by: PHYSICIAN ASSISTANT

## 2025-07-09 RX ORDER — GADOTERIDOL 279.3 MG/ML
1 INJECTION INTRAVENOUS ONCE
Status: COMPLETED | OUTPATIENT
Start: 2025-07-09 | End: 2025-07-09

## 2025-07-09 RX ORDER — IOPAMIDOL 612 MG/ML
12 INJECTION, SOLUTION INTRAVASCULAR ONCE
Status: COMPLETED | OUTPATIENT
Start: 2025-07-09 | End: 2025-07-09

## 2025-07-09 RX ADMIN — GADOTERIDOL 1 ML: 279.3 INJECTION, SOLUTION INTRAVENOUS at 10:13

## 2025-07-09 RX ADMIN — IOPAMIDOL 2 ML: 612 INJECTION, SOLUTION INTRAVENOUS at 10:13

## 2025-07-09 NOTE — PROCEDURES
Procedure: Fluoroscopic Guided Left Shoulder Arthrogram    Diagnosis: pain, frequent dislocation    Findings: Successful intraarticular injection. Approximately 7 cc of a mixture of 0.1cc ProHance, 15cc normal saline and 5cc Isovue 300.      Specimen: None.    Anesthesia: Local infiltration of Lidocaine 1% 3ml without epinephrine     EBL: Minimal.    Complication: None immediately post procedure. Patient tolerated procedure well.     Plan: Further imaging in the MRI suite then discharge to home.     Comments:    See radiology dictation in PACs for image review and additional procedural information.

## 2025-07-09 NOTE — DISCHARGE INSTRUCTIONS
How can you care for yourself at home?  Put ice or a cold pack on the area for 10 to 20 minutes at a time. Put a thin cloth between the ice and your skin.  You can take an over-the-counter pain medicine, such as acetaminophen (Tylenol), ibuprofen (Advil, Motrin), or naproxen (Aleve). Be safe with medicines. Read and follow all instructions on the label.  Avoid strenuous activities for one day after procedure. In particular, avoid ones that put stress on the area where you got the shot.  If you have dressings over the area, keep them clean and dry. You may remove bandaid after 1 day.  When should you call for help?   Call your doctor now or seek immediate medical care if:    You have signs of infection, such as:  Increased pain, swelling, warmth, or redness.  Red streaks leading from the site.  Pus draining from the site.  A fever.     You may shower this evening or tomorrow, keep the water towards your back. You may apply a new Band-Aid if you wish. Keep area clean and dry. No soaking extremity in a bath or swimming pool/hot tub.

## 2025-07-14 ENCOUNTER — OFFICE VISIT (OUTPATIENT)
Age: 30
End: 2025-07-14
Payer: COMMERCIAL

## 2025-07-14 VITALS — TEMPERATURE: 97.7 F | BODY MASS INDEX: 24.74 KG/M2 | HEIGHT: 75 IN | WEIGHT: 199 LBS

## 2025-07-14 DIAGNOSIS — S43.085A OTHER DISLOCATION OF LEFT SHOULDER JOINT, INITIAL ENCOUNTER: Primary | ICD-10-CM

## 2025-07-14 DIAGNOSIS — S43.432A GLENOID LABRAL TEAR, LEFT, INITIAL ENCOUNTER: ICD-10-CM

## 2025-07-14 PROCEDURE — 99213 OFFICE O/P EST LOW 20 MIN: CPT | Performed by: ORTHOPAEDIC SURGERY

## 2025-07-14 NOTE — PROGRESS NOTES
Chief Complaint   Patient presents with    Results     Pt presents to office today for left shoulder MRI results.        Alfonso Berger returns today for follow-up of left shoulder.  He is still bothering him quite a bit.      Past Medical History:   Diagnosis Date    Hypothyroidism     Seizures (HCC)      No past surgical history on file.    Current Outpatient Medications:     albuterol sulfate HFA (VENTOLIN HFA) 108 (90 Base) MCG/ACT inhaler, Inhale 2 puffs into the lungs 4 times daily as needed for Shortness of Breath or Wheezing, Disp: 18 g, Rfl: 0    divalproex (DEPAKOTE ER) 250 MG ER tablet, Take 2 tablets by mouth 2 times daily Take one tablet by the mouth every morning and 2 tablets in the evening, Disp: , Rfl:   No Known Allergies  Social History     Socioeconomic History    Marital status: Single     Spouse name: Not on file    Number of children: Not on file    Years of education: Not on file    Highest education level: Not on file   Occupational History    Not on file   Tobacco Use    Smoking status: Every Day     Current packs/day: 0.50     Average packs/day: 0.5 packs/day for 17.5 years (8.8 ttl pk-yrs)     Types: Cigarettes     Start date: 2008    Smokeless tobacco: Never   Vaping Use    Vaping status: Never Used   Substance and Sexual Activity    Alcohol use: Yes     Comment: socially.    Drug use: No    Sexual activity: Defer   Other Topics Concern    Not on file   Social History Narrative    Not on file     Social Drivers of Health     Financial Resource Strain: Not on file   Food Insecurity: No Food Insecurity (7/8/2025)    Hunger Vital Sign     Worried About Running Out of Food in the Last Year: Never true     Ran Out of Food in the Last Year: Never true   Transportation Needs: No Transportation Needs (7/8/2025)    PRAPARE - Transportation     Lack of Transportation (Medical): No     Lack of Transportation (Non-Medical): No   Physical Activity: Not on file   Stress: Not on file   Social

## 2025-07-21 ENCOUNTER — TELEPHONE (OUTPATIENT)
Age: 30
End: 2025-07-21

## 2025-07-21 PROBLEM — S43.085A: Status: ACTIVE | Noted: 2025-07-21

## 2025-07-21 NOTE — TELEPHONE ENCOUNTER
Called patient to schedule surgery that was approved by Workers comp. Left message to return phone call.

## 2025-08-04 ENCOUNTER — ANESTHESIA EVENT (OUTPATIENT)
Dept: OPERATING ROOM | Age: 30
End: 2025-08-04
Payer: COMMERCIAL

## 2025-08-04 ASSESSMENT — LIFESTYLE VARIABLES: SMOKING_STATUS: 1

## 2025-08-05 ENCOUNTER — ANESTHESIA (OUTPATIENT)
Dept: OPERATING ROOM | Age: 30
End: 2025-08-05
Payer: COMMERCIAL

## 2025-08-05 ENCOUNTER — HOSPITAL ENCOUNTER (OUTPATIENT)
Age: 30
Setting detail: OUTPATIENT SURGERY
Discharge: HOME | End: 2025-08-05
Attending: ORTHOPAEDIC SURGERY | Admitting: ORTHOPAEDIC SURGERY
Payer: COMMERCIAL

## 2025-08-05 VITALS
RESPIRATION RATE: 16 BRPM | TEMPERATURE: 97.2 F | SYSTOLIC BLOOD PRESSURE: 121 MMHG | OXYGEN SATURATION: 95 % | BODY MASS INDEX: 25.12 KG/M2 | DIASTOLIC BLOOD PRESSURE: 75 MMHG | HEART RATE: 68 BPM | HEIGHT: 75 IN | WEIGHT: 202 LBS

## 2025-08-05 DIAGNOSIS — S43.015A ANTERIOR SHOULDER DISLOCATION, LEFT, INITIAL ENCOUNTER: Primary | ICD-10-CM

## 2025-08-05 PROCEDURE — 7100000001 HC PACU RECOVERY - ADDTL 15 MIN: Performed by: ORTHOPAEDIC SURGERY

## 2025-08-05 PROCEDURE — 6360000002 HC RX W HCPCS: Performed by: NURSE ANESTHETIST, CERTIFIED REGISTERED

## 2025-08-05 PROCEDURE — 2720000010 HC SURG SUPPLY STERILE: Performed by: ORTHOPAEDIC SURGERY

## 2025-08-05 PROCEDURE — 7100000011 HC PHASE II RECOVERY - ADDTL 15 MIN: Performed by: ORTHOPAEDIC SURGERY

## 2025-08-05 PROCEDURE — 2500000003 HC RX 250 WO HCPCS: Performed by: ORTHOPAEDIC SURGERY

## 2025-08-05 PROCEDURE — 3600000004 HC SURGERY LEVEL 4 BASE: Performed by: ORTHOPAEDIC SURGERY

## 2025-08-05 PROCEDURE — 3600000014 HC SURGERY LEVEL 4 ADDTL 15MIN: Performed by: ORTHOPAEDIC SURGERY

## 2025-08-05 PROCEDURE — 3700000000 HC ANESTHESIA ATTENDED CARE: Performed by: ORTHOPAEDIC SURGERY

## 2025-08-05 PROCEDURE — 2709999900 HC NON-CHARGEABLE SUPPLY: Performed by: ORTHOPAEDIC SURGERY

## 2025-08-05 PROCEDURE — 6360000002 HC RX W HCPCS: Performed by: ORTHOPAEDIC SURGERY

## 2025-08-05 PROCEDURE — 2580000003 HC RX 258: Performed by: ANESTHESIOLOGY

## 2025-08-05 PROCEDURE — 2500000003 HC RX 250 WO HCPCS: Performed by: NURSE ANESTHETIST, CERTIFIED REGISTERED

## 2025-08-05 PROCEDURE — 7100000010 HC PHASE II RECOVERY - FIRST 15 MIN: Performed by: ORTHOPAEDIC SURGERY

## 2025-08-05 PROCEDURE — 7100000000 HC PACU RECOVERY - FIRST 15 MIN: Performed by: ORTHOPAEDIC SURGERY

## 2025-08-05 PROCEDURE — 3700000001 HC ADD 15 MINUTES (ANESTHESIA): Performed by: ORTHOPAEDIC SURGERY

## 2025-08-05 RX ORDER — SODIUM CHLORIDE 9 MG/ML
INJECTION, SOLUTION INTRAVENOUS PRN
Status: DISCONTINUED | OUTPATIENT
Start: 2025-08-05 | End: 2025-08-05 | Stop reason: HOSPADM

## 2025-08-05 RX ORDER — ONDANSETRON 2 MG/ML
INJECTION INTRAMUSCULAR; INTRAVENOUS
Status: DISCONTINUED | OUTPATIENT
Start: 2025-08-05 | End: 2025-08-05 | Stop reason: SDUPTHER

## 2025-08-05 RX ORDER — MIDAZOLAM HYDROCHLORIDE 1 MG/ML
INJECTION, SOLUTION INTRAMUSCULAR; INTRAVENOUS
Status: DISCONTINUED | OUTPATIENT
Start: 2025-08-05 | End: 2025-08-05 | Stop reason: SDUPTHER

## 2025-08-05 RX ORDER — PROPOFOL 10 MG/ML
INJECTION, EMULSION INTRAVENOUS
Status: DISCONTINUED | OUTPATIENT
Start: 2025-08-05 | End: 2025-08-05 | Stop reason: SDUPTHER

## 2025-08-05 RX ORDER — DEXAMETHASONE SODIUM PHOSPHATE 10 MG/ML
INJECTION, SOLUTION INTRAMUSCULAR; INTRAVENOUS
Status: DISCONTINUED | OUTPATIENT
Start: 2025-08-05 | End: 2025-08-05 | Stop reason: SDUPTHER

## 2025-08-05 RX ORDER — SODIUM CHLORIDE 0.9 % (FLUSH) 0.9 %
5-40 SYRINGE (ML) INJECTION EVERY 12 HOURS SCHEDULED
Status: DISCONTINUED | OUTPATIENT
Start: 2025-08-05 | End: 2025-08-05 | Stop reason: HOSPADM

## 2025-08-05 RX ORDER — ROCURONIUM BROMIDE 10 MG/ML
INJECTION, SOLUTION INTRAVENOUS
Status: DISCONTINUED | OUTPATIENT
Start: 2025-08-05 | End: 2025-08-05 | Stop reason: SDUPTHER

## 2025-08-05 RX ORDER — SODIUM CHLORIDE 0.9 % (FLUSH) 0.9 %
5-40 SYRINGE (ML) INJECTION PRN
Status: DISCONTINUED | OUTPATIENT
Start: 2025-08-05 | End: 2025-08-05 | Stop reason: HOSPADM

## 2025-08-05 RX ORDER — DIPHENHYDRAMINE HYDROCHLORIDE 50 MG/ML
INJECTION, SOLUTION INTRAMUSCULAR; INTRAVENOUS
Status: DISCONTINUED | OUTPATIENT
Start: 2025-08-05 | End: 2025-08-05 | Stop reason: SDUPTHER

## 2025-08-05 RX ORDER — ONDANSETRON 2 MG/ML
4 INJECTION INTRAMUSCULAR; INTRAVENOUS
Status: DISCONTINUED | OUTPATIENT
Start: 2025-08-05 | End: 2025-08-05 | Stop reason: HOSPADM

## 2025-08-05 RX ORDER — SODIUM CHLORIDE, SODIUM LACTATE, POTASSIUM CHLORIDE, CALCIUM CHLORIDE 600; 310; 30; 20 MG/100ML; MG/100ML; MG/100ML; MG/100ML
INJECTION, SOLUTION INTRAVENOUS CONTINUOUS
Status: DISCONTINUED | OUTPATIENT
Start: 2025-08-05 | End: 2025-08-05 | Stop reason: HOSPADM

## 2025-08-05 RX ORDER — KETOROLAC TROMETHAMINE 30 MG/ML
INJECTION, SOLUTION INTRAMUSCULAR; INTRAVENOUS
Status: DISCONTINUED | OUTPATIENT
Start: 2025-08-05 | End: 2025-08-05 | Stop reason: SDUPTHER

## 2025-08-05 RX ORDER — LIDOCAINE HYDROCHLORIDE 20 MG/ML
INJECTION, SOLUTION INTRAVENOUS
Status: DISCONTINUED | OUTPATIENT
Start: 2025-08-05 | End: 2025-08-05 | Stop reason: SDUPTHER

## 2025-08-05 RX ORDER — HYDROCODONE BITARTRATE AND ACETAMINOPHEN 10; 325 MG/1; MG/1
1 TABLET ORAL EVERY 6 HOURS PRN
Qty: 28 TABLET | Refills: 0 | Status: SHIPPED | OUTPATIENT
Start: 2025-08-05 | End: 2025-08-12

## 2025-08-05 RX ORDER — FENTANYL CITRATE 50 UG/ML
INJECTION, SOLUTION INTRAMUSCULAR; INTRAVENOUS
Status: DISCONTINUED | OUTPATIENT
Start: 2025-08-05 | End: 2025-08-05 | Stop reason: SDUPTHER

## 2025-08-05 RX ORDER — DEXMEDETOMIDINE HYDROCHLORIDE 100 UG/ML
INJECTION, SOLUTION INTRAVENOUS
Status: DISCONTINUED | OUTPATIENT
Start: 2025-08-05 | End: 2025-08-05 | Stop reason: SDUPTHER

## 2025-08-05 RX ADMIN — KETOROLAC TROMETHAMINE 30 MG: 30 INJECTION, SOLUTION INTRAMUSCULAR at 13:20

## 2025-08-05 RX ADMIN — SODIUM CHLORIDE, POTASSIUM CHLORIDE, SODIUM LACTATE AND CALCIUM CHLORIDE: 600; 310; 30; 20 INJECTION, SOLUTION INTRAVENOUS at 13:26

## 2025-08-05 RX ADMIN — LIDOCAINE HYDROCHLORIDE 80 MG: 20 INJECTION, SOLUTION INTRAVENOUS at 12:54

## 2025-08-05 RX ADMIN — WATER 2000 MG: 1 INJECTION INTRAMUSCULAR; INTRAVENOUS; SUBCUTANEOUS at 12:50

## 2025-08-05 RX ADMIN — MIDAZOLAM 2 MG: 1 INJECTION INTRAMUSCULAR; INTRAVENOUS at 12:51

## 2025-08-05 RX ADMIN — FENTANYL CITRATE 50 MCG: 50 INJECTION, SOLUTION INTRAMUSCULAR; INTRAVENOUS at 13:08

## 2025-08-05 RX ADMIN — SODIUM CHLORIDE, POTASSIUM CHLORIDE, SODIUM LACTATE AND CALCIUM CHLORIDE: 600; 310; 30; 20 INJECTION, SOLUTION INTRAVENOUS at 10:14

## 2025-08-05 RX ADMIN — DIPHENHYDRAMINE HYDROCHLORIDE 25 MG: 50 INJECTION INTRAMUSCULAR; INTRAVENOUS at 12:51

## 2025-08-05 RX ADMIN — ROCURONIUM BROMIDE 50 MG: 10 INJECTION, SOLUTION INTRAVENOUS at 12:54

## 2025-08-05 RX ADMIN — DEXAMETHASONE SODIUM PHOSPHATE 10 MG: 10 INJECTION, SOLUTION INTRAMUSCULAR; INTRAVENOUS at 13:08

## 2025-08-05 RX ADMIN — FENTANYL CITRATE 50 MCG: 50 INJECTION, SOLUTION INTRAMUSCULAR; INTRAVENOUS at 13:16

## 2025-08-05 RX ADMIN — DEXMEDETOMIDINE HYDROCHLORIDE 10 MCG: 100 INJECTION, SOLUTION INTRAVENOUS at 12:54

## 2025-08-05 RX ADMIN — PROPOFOL 200 MG: 10 INJECTION, EMULSION INTRAVENOUS at 12:54

## 2025-08-05 RX ADMIN — FENTANYL CITRATE 50 MCG: 50 INJECTION, SOLUTION INTRAMUSCULAR; INTRAVENOUS at 12:54

## 2025-08-05 RX ADMIN — SUGAMMADEX 100 MG: 100 INJECTION, SOLUTION INTRAVENOUS at 13:31

## 2025-08-05 RX ADMIN — SUGAMMADEX 400 MG: 100 INJECTION, SOLUTION INTRAVENOUS at 13:25

## 2025-08-05 RX ADMIN — ONDANSETRON 4 MG: 2 INJECTION, SOLUTION INTRAMUSCULAR; INTRAVENOUS at 13:20

## 2025-08-05 ASSESSMENT — PAIN - FUNCTIONAL ASSESSMENT
PAIN_FUNCTIONAL_ASSESSMENT: 0-10
PAIN_FUNCTIONAL_ASSESSMENT: 0-10
PAIN_FUNCTIONAL_ASSESSMENT: NONE - DENIES PAIN
PAIN_FUNCTIONAL_ASSESSMENT: NONE - DENIES PAIN

## 2025-08-05 ASSESSMENT — PAIN DESCRIPTION - DESCRIPTORS
DESCRIPTORS: SORE
DESCRIPTORS: SORE

## 2025-08-18 ENCOUNTER — OFFICE VISIT (OUTPATIENT)
Age: 30
End: 2025-08-18

## 2025-08-18 DIAGNOSIS — S43.432A GLENOID LABRAL TEAR, LEFT, INITIAL ENCOUNTER: Primary | ICD-10-CM

## 2025-08-18 PROCEDURE — 99024 POSTOP FOLLOW-UP VISIT: CPT | Performed by: ORTHOPAEDIC SURGERY

## (undated) DEVICE — SPONGE GZ STERIL W2XL2IN COT 8 PLY TYP VII WVN C FLD DSGN NON21420

## (undated) DEVICE — NEEDLE SPNL L3.5IN PNK HUB S STL REG WALL FIT STYL W/ QNCKE

## (undated) DEVICE — SOL IRR SOD CHL 0.9% TITAN XL CNTNR 3000ML

## (undated) DEVICE — COUNTER NDL 40 COUNT FOAM BLK SGL MAG

## (undated) DEVICE — BLADE SURG 15 SS STRL CISION LF DISP

## (undated) DEVICE — SPONGE GZ W4XL4IN 100% COT 16 PLY RADPQ HIGHLY ABSRB

## (undated) DEVICE — PACK BSIN W54XL54IN 2OZ CLR BASIC DBL INCLUDE 2 EMSIS BSIN 2

## (undated) DEVICE — DRAPE FULL SHT 70X100 AURORA

## (undated) DEVICE — TUBING PMP L16FT MAIN DISP FOR AR-6400 AR-6475 Â€“ ORDER MULTIPLES OF 10 EACH

## (undated) DEVICE — MARKER SURG SKIN GENTIAN VLT REG TIP W/ 6IN RUL AND BLNK DYNJSM02

## (undated) DEVICE — COVER LT HNDL GRN PLAS FLX SFT

## (undated) DEVICE — BLADE SURG 11 SS STRL CISION LF DISP

## (undated) DEVICE — MANIFOLD SUCT 4 PRT 2 CANSTR FLTR DISP NEPTUNE 2

## (undated) DEVICE — TOWEL SURG W17XL27IN BLU COT STD PREWASHED STERILE 6 PER PK

## (undated) DEVICE — TUBING SCTION CONN 1/4X10 RIB

## (undated) DEVICE — DRESSING TRNSPAR W2XL2.75IN FLM SHT SEMIPERMEABLE WIND

## (undated) DEVICE — CANNULA ARTHSCP L5CM DIA8.25MM TRNSLUC THRD FLX W/ NO

## (undated) DEVICE — Device

## (undated) DEVICE — PACK SHLDR DRP PROXIMA II

## (undated) DEVICE — BANDAGE COMPR W4INXL5YD WHT BGE POLY COT M E WRP WV HK AND

## (undated) DEVICE — GOWN SURG 2XL L56IN XLN BLU POLYETH SMS REINF BRTH FLM SL

## (undated) DEVICE — KIT PT CARE SCHLEIN ULT SHLDR POS

## (undated) DEVICE — GOWN SURG XL L47IN BLU FABRICREINFORCED SET IN SL HK LOOP

## (undated) DEVICE — WIPES SKIN CLOTH READYPREP 9 X 10.5 IN 2% CHLORHEX GLUCONATE CHG PREOP

## (undated) DEVICE — DRESSING TRNSPAR W5XL4.5IN FLM SHT SEMIPERMEABLE WIND

## (undated) DEVICE — GLOVE ORANGE PI 8 1/2   MSG9085

## (undated) DEVICE — APPLICATOR MEDICATED 26 CC SOLUTION HI LT ORNG CHLORAPREP

## (undated) DEVICE — SYRINGE MED 50ML LUERLOCK TIP

## (undated) DEVICE — KIT PERC INSRT 17GA SPNL NDL 1.1MM NIT GWIRE PORTAL DIL

## (undated) DEVICE — DRAPE TRNSPAR W51XL47IN PLAS MATTE FINISH U SHP IMPERV

## (undated) DEVICE — BLADE SAW W0.35XL1.22IN THK0.015IN MIC OSC THN CUT EDGE AGG